# Patient Record
Sex: MALE | Race: WHITE | Employment: UNEMPLOYED | URBAN - METROPOLITAN AREA
[De-identification: names, ages, dates, MRNs, and addresses within clinical notes are randomized per-mention and may not be internally consistent; named-entity substitution may affect disease eponyms.]

---

## 2022-12-15 ENCOUNTER — HOSPITAL ENCOUNTER (EMERGENCY)
Age: 42
Discharge: HOME | End: 2022-12-15
Attending: EMERGENCY MEDICINE

## 2022-12-15 VITALS
HEIGHT: 68 IN | HEART RATE: 87 BPM | BODY MASS INDEX: 30.31 KG/M2 | OXYGEN SATURATION: 99 % | SYSTOLIC BLOOD PRESSURE: 112 MMHG | RESPIRATION RATE: 16 BRPM | TEMPERATURE: 98 F | DIASTOLIC BLOOD PRESSURE: 69 MMHG | WEIGHT: 200 LBS

## 2022-12-15 DIAGNOSIS — F10.920 ACUTE ALCOHOLIC INTOXICATION WITHOUT COMPLICATION (HCC): ICD-10-CM

## 2022-12-15 DIAGNOSIS — F39 MOOD DISORDER (HCC): Primary | ICD-10-CM

## 2022-12-15 LAB
ACETAMINOPHEN LEVEL: <5 MCG/ML (ref 10–30)
ALBUMIN SERPL-MCNC: 4.7 G/DL (ref 3.5–5.2)
ALP BLD-CCNC: 99 U/L (ref 40–129)
ALT SERPL-CCNC: 16 U/L (ref 0–40)
AMPHETAMINE SCREEN, URINE: NOT DETECTED
ANION GAP SERPL CALCULATED.3IONS-SCNC: 15 MMOL/L (ref 7–16)
AST SERPL-CCNC: 15 U/L (ref 0–39)
BARBITURATE SCREEN URINE: NOT DETECTED
BASOPHILS ABSOLUTE: 0.06 E9/L (ref 0–0.2)
BASOPHILS RELATIVE PERCENT: 0.7 % (ref 0–2)
BENZODIAZEPINE SCREEN, URINE: NOT DETECTED
BILIRUB SERPL-MCNC: <0.2 MG/DL (ref 0–1.2)
BUN BLDV-MCNC: 15 MG/DL (ref 6–20)
CALCIUM SERPL-MCNC: 9.6 MG/DL (ref 8.6–10.2)
CANNABINOID SCREEN URINE: NOT DETECTED
CHLORIDE BLD-SCNC: 110 MMOL/L (ref 98–107)
CO2: 19 MMOL/L (ref 22–29)
COCAINE METABOLITE SCREEN URINE: NOT DETECTED
CREAT SERPL-MCNC: 0.7 MG/DL (ref 0.7–1.2)
EKG ATRIAL RATE: 101 BPM
EKG P AXIS: 24 DEGREES
EKG P-R INTERVAL: 184 MS
EKG Q-T INTERVAL: 360 MS
EKG QRS DURATION: 100 MS
EKG QTC CALCULATION (BAZETT): 466 MS
EKG R AXIS: -9 DEGREES
EKG T AXIS: 15 DEGREES
EKG VENTRICULAR RATE: 101 BPM
EOSINOPHILS ABSOLUTE: 0.17 E9/L (ref 0.05–0.5)
EOSINOPHILS RELATIVE PERCENT: 2.1 % (ref 0–6)
ETHANOL: 101 MG/DL (ref 0–0.08)
ETHANOL: 272 MG/DL (ref 0–0.08)
FENTANYL SCREEN, URINE: NOT DETECTED
GFR SERPL CREATININE-BSD FRML MDRD: >60 ML/MIN/1.73
GLUCOSE BLD-MCNC: 107 MG/DL (ref 74–99)
HCT VFR BLD CALC: 46.6 % (ref 37–54)
HEMOGLOBIN: 15.6 G/DL (ref 12.5–16.5)
IMMATURE GRANULOCYTES #: 0.02 E9/L
IMMATURE GRANULOCYTES %: 0.2 % (ref 0–5)
INFLUENZA A: NOT DETECTED
INFLUENZA B: NOT DETECTED
LYMPHOCYTES ABSOLUTE: 3.19 E9/L (ref 1.5–4)
LYMPHOCYTES RELATIVE PERCENT: 38.5 % (ref 20–42)
Lab: NORMAL
MCH RBC QN AUTO: 30.8 PG (ref 26–35)
MCHC RBC AUTO-ENTMCNC: 33.5 % (ref 32–34.5)
MCV RBC AUTO: 92.1 FL (ref 80–99.9)
METHADONE SCREEN, URINE: NOT DETECTED
MONOCYTES ABSOLUTE: 0.88 E9/L (ref 0.1–0.95)
MONOCYTES RELATIVE PERCENT: 10.6 % (ref 2–12)
NEUTROPHILS ABSOLUTE: 3.97 E9/L (ref 1.8–7.3)
NEUTROPHILS RELATIVE PERCENT: 47.9 % (ref 43–80)
OPIATE SCREEN URINE: NOT DETECTED
OXYCODONE URINE: NOT DETECTED
PDW BLD-RTO: 12.5 FL (ref 11.5–15)
PHENCYCLIDINE SCREEN URINE: NOT DETECTED
PLATELET # BLD: 408 E9/L (ref 130–450)
PMV BLD AUTO: 9.3 FL (ref 7–12)
POTASSIUM SERPL-SCNC: 4.3 MMOL/L (ref 3.5–5)
RBC # BLD: 5.06 E12/L (ref 3.8–5.8)
SALICYLATE, SERUM: <0.3 MG/DL (ref 0–30)
SARS-COV-2 RNA, RT PCR: NOT DETECTED
SODIUM BLD-SCNC: 144 MMOL/L (ref 132–146)
TOTAL PROTEIN: 8 G/DL (ref 6.4–8.3)
TRICYCLIC ANTIDEPRESSANTS SCREEN SERUM: NEGATIVE NG/ML
WBC # BLD: 8.3 E9/L (ref 4.5–11.5)

## 2022-12-15 PROCEDURE — 93005 ELECTROCARDIOGRAM TRACING: CPT | Performed by: EMERGENCY MEDICINE

## 2022-12-15 PROCEDURE — 80179 DRUG ASSAY SALICYLATE: CPT

## 2022-12-15 PROCEDURE — 96372 THER/PROPH/DIAG INJ SC/IM: CPT

## 2022-12-15 PROCEDURE — 6360000002 HC RX W HCPCS: Performed by: EMERGENCY MEDICINE

## 2022-12-15 PROCEDURE — 80143 DRUG ASSAY ACETAMINOPHEN: CPT

## 2022-12-15 PROCEDURE — 87636 SARSCOV2 & INF A&B AMP PRB: CPT

## 2022-12-15 PROCEDURE — 99285 EMERGENCY DEPT VISIT HI MDM: CPT

## 2022-12-15 PROCEDURE — 85025 COMPLETE CBC W/AUTO DIFF WBC: CPT

## 2022-12-15 PROCEDURE — 80053 COMPREHEN METABOLIC PANEL: CPT

## 2022-12-15 PROCEDURE — 82077 ASSAY SPEC XCP UR&BREATH IA: CPT

## 2022-12-15 PROCEDURE — 80307 DRUG TEST PRSMV CHEM ANLYZR: CPT

## 2022-12-15 RX ORDER — QUETIAPINE FUMARATE 25 MG/1
TABLET, FILM COATED ORAL
COMMUNITY
Start: 2022-10-30

## 2022-12-15 RX ORDER — NALTREXONE HYDROCHLORIDE 50 MG/1
TABLET, FILM COATED ORAL
COMMUNITY
Start: 2022-11-08

## 2022-12-15 RX ORDER — LORAZEPAM 2 MG/ML
2 INJECTION INTRAMUSCULAR ONCE
Status: COMPLETED | OUTPATIENT
Start: 2022-12-15 | End: 2022-12-15

## 2022-12-15 RX ORDER — FLUOXETINE HYDROCHLORIDE 20 MG/1
CAPSULE ORAL
COMMUNITY
Start: 2022-10-31

## 2022-12-15 RX ORDER — DIPHENHYDRAMINE HYDROCHLORIDE 50 MG/ML
25 INJECTION INTRAMUSCULAR; INTRAVENOUS ONCE
Status: COMPLETED | OUTPATIENT
Start: 2022-12-15 | End: 2022-12-15

## 2022-12-15 RX ORDER — HALOPERIDOL 5 MG/ML
5 INJECTION INTRAMUSCULAR ONCE
Status: COMPLETED | OUTPATIENT
Start: 2022-12-15 | End: 2022-12-15

## 2022-12-15 RX ORDER — TRAZODONE HYDROCHLORIDE 50 MG/1
TABLET ORAL
COMMUNITY
Start: 2022-11-08

## 2022-12-15 RX ORDER — FLUOXETINE HYDROCHLORIDE 40 MG/1
CAPSULE ORAL
COMMUNITY
Start: 2022-12-05

## 2022-12-15 RX ADMIN — DIPHENHYDRAMINE HYDROCHLORIDE 25 MG: 50 INJECTION, SOLUTION INTRAMUSCULAR; INTRAVENOUS at 04:33

## 2022-12-15 RX ADMIN — LORAZEPAM 2 MG: 2 INJECTION INTRAMUSCULAR; INTRAVENOUS at 04:34

## 2022-12-15 RX ADMIN — HALOPERIDOL LACTATE 5 MG: 5 INJECTION INTRAMUSCULAR at 04:33

## 2022-12-15 ASSESSMENT — LIFESTYLE VARIABLES
HOW MANY STANDARD DRINKS CONTAINING ALCOHOL DO YOU HAVE ON A TYPICAL DAY: 10 OR MORE
HOW OFTEN DO YOU HAVE A DRINK CONTAINING ALCOHOL: 4 OR MORE TIMES A WEEK

## 2022-12-15 ASSESSMENT — PAIN - FUNCTIONAL ASSESSMENT
PAIN_FUNCTIONAL_ASSESSMENT: NONE - DENIES PAIN
PAIN_FUNCTIONAL_ASSESSMENT: NONE - DENIES PAIN

## 2022-12-15 NOTE — ED PROVIDER NOTES
Patient was seen overnight by overnight physician due to alcohol intoxication and making suicidal threats. Patient had been pink slipped prior to arrival.  I was asked to evaluate the patient because he is now sober. On my evaluation, the patient is calm and cooperative. He appears clinically sober and is answering questions appropriately. He does not remember anything that happened last night. He is adamantly denying any suicidal or homicidal ideation. He has good decision-making capacity. States that he needs to go to work and he also needs to care for his wife who has cancer. He was observed ambulating and eating without difficulty. He is appropriate for discharge. Will revoke pink slip.      Forrest Lam MD  12/15/22 3393

## 2022-12-15 NOTE — ED PROVIDER NOTES
HPI:  12/15/22, Time: 4:04 AM JORGE A Tello is a 36 y.o. male presenting to the ED for psychiatric evaluation, beginning 1 day ago. The complaint has been persistent, moderate in severity, and worsened by nothing. Patient presenting here because of psychiatric evaluation. Patient was causing disturbance in a hotel. Patient was agitated. Patient reportedly stated to police officers that he wanted them to shoot him. Patient does report feeling suicidal.  Patient does report he is on Seroquel. Patient reporting no homicidal ideation. He reports no hallucinations. Patient does admit to drinking alcohol today. Patient reporting no abdominal pain no vomiting there is no history of fall or injury. Patient reporting no cough. There is no fever. ROS:   Pertinent positives and negatives are stated within HPI, all other systems reviewed and are negative.  --------------------------------------------- PAST HISTORY ---------------------------------------------  Past Medical History:  has no past medical history on file. Past Surgical History:  has no past surgical history on file. Social History:  reports current alcohol use. Family History: family history is not on file. The patients home medications have been reviewed. Allergies: Patient has no known allergies. ---------------------------------------------------PHYSICAL EXAM--------------------------------------    Constitutional/General: Alert and oriented to person and place but not time. Patient intoxicated  Head: Normocephalic and atraumatic  Eyes: PERRL, EOMI  Mouth: Oropharynx clear, handling secretions, no trismus  Neck: Supple, full ROM, non tender to palpation in the midline, no stridor, no crepitus, no meningeal signs  Pulmonary: Lungs clear to auscultation bilaterally, no wheezes, rales, or rhonchi. Not in respiratory distress  Cardiovascular:  Regular rate. Regular rhythm. No murmurs, gallops, or rubs.  2+ distal pulses  Chest: no chest wall tenderness  Abdomen: Soft. Non tender. Non distended. +BS. No rebound, guarding, or rigidity. No pulsatile masses appreciated. Musculoskeletal: Moves all extremities x 4. Warm and well perfused, no clubbing, cyanosis, or edema. Capillary refill <3 seconds  Skin: warm and dry. No rashes. Neurologic: Oriented to person place but not to time, CN 2-12 grossly intact, no focal deficits, symmetric strength 5/5 in the upper and lower extremities bilaterally  Psych: Patient intoxicated reporting suicidal thoughts. Patient reportedly wanted police to shoot him. Patient reporting no homicidal ideation or hallucinations    -------------------------------------------------- RESULTS -------------------------------------------------  I have personally reviewed all laboratory and imaging results for this patient. Results are listed below.      LABS:  Results for orders placed or performed during the hospital encounter of 12/15/22   COVID-19 & Influenza Combo    Specimen: Nasopharyngeal Swab   Result Value Ref Range    SARS-CoV-2 RNA, RT PCR NOT DETECTED NOT DETECTED    INFLUENZA A NOT DETECTED NOT DETECTED    INFLUENZA B NOT DETECTED NOT DETECTED   CBC with Auto Differential   Result Value Ref Range    WBC 8.3 4.5 - 11.5 E9/L    RBC 5.06 3.80 - 5.80 E12/L    Hemoglobin 15.6 12.5 - 16.5 g/dL    Hematocrit 46.6 37.0 - 54.0 %    MCV 92.1 80.0 - 99.9 fL    MCH 30.8 26.0 - 35.0 pg    MCHC 33.5 32.0 - 34.5 %    RDW 12.5 11.5 - 15.0 fL    Platelets 601 833 - 512 E9/L    MPV 9.3 7.0 - 12.0 fL    Neutrophils % 47.9 43.0 - 80.0 %    Immature Granulocytes % 0.2 0.0 - 5.0 %    Lymphocytes % 38.5 20.0 - 42.0 %    Monocytes % 10.6 2.0 - 12.0 %    Eosinophils % 2.1 0.0 - 6.0 %    Basophils % 0.7 0.0 - 2.0 %    Neutrophils Absolute 3.97 1.80 - 7.30 E9/L    Immature Granulocytes # 0.02 E9/L    Lymphocytes Absolute 3.19 1.50 - 4.00 E9/L    Monocytes Absolute 0.88 0.10 - 0.95 E9/L    Eosinophils Absolute 0.17 0.05 - 0.50 E9/L    Basophils Absolute 0.06 0.00 - 0.20 E9/L   Comprehensive Metabolic Panel   Result Value Ref Range    Sodium 144 132 - 146 mmol/L    Potassium 4.3 3.5 - 5.0 mmol/L    Chloride 110 (H) 98 - 107 mmol/L    CO2 19 (L) 22 - 29 mmol/L    Anion Gap 15 7 - 16 mmol/L    Glucose 107 (H) 74 - 99 mg/dL    BUN 15 6 - 20 mg/dL    Creatinine 0.7 0.7 - 1.2 mg/dL    Est, Glom Filt Rate >60 >=60 mL/min/1.73    Calcium 9.6 8.6 - 10.2 mg/dL    Total Protein 8.0 6.4 - 8.3 g/dL    Albumin 4.7 3.5 - 5.2 g/dL    Total Bilirubin <0.2 0.0 - 1.2 mg/dL    Alkaline Phosphatase 99 40 - 129 U/L    ALT 16 0 - 40 U/L    AST 15 0 - 39 U/L   Serum Drug Screen   Result Value Ref Range    Ethanol Lvl 272 mg/dL    Acetaminophen Level <5.0 (L) 10.0 - 33.8 mcg/mL    Salicylate, Serum <5.4 0.0 - 30.0 mg/dL    TCA Scrn NEGATIVE Cutoff:300 ng/mL   URINE DRUG SCREEN   Result Value Ref Range    Amphetamine Screen, Urine NOT DETECTED Negative <1000 ng/mL    Barbiturate Screen, Ur NOT DETECTED Negative < 200 ng/mL    Benzodiazepine Screen, Urine NOT DETECTED Negative < 200 ng/mL    Cannabinoid Scrn, Ur NOT DETECTED Negative < 50ng/mL    Cocaine Metabolite Screen, Urine NOT DETECTED Negative < 300 ng/mL    Opiate Scrn, Ur NOT DETECTED Negative < 300ng/mL    PCP Screen, Urine NOT DETECTED Negative < 25 ng/mL    Methadone Screen, Urine NOT DETECTED Negative <300 ng/mL    Oxycodone Urine NOT DETECTED Negative <100 ng/mL    FENTANYL SCREEN, URINE NOT DETECTED Negative <1 ng/mL    Drug Screen Comment: see below        RADIOLOGY:  Interpreted by Radiologist.  No orders to display       EKG: This EKG is signed and interpreted by me. Rate: 101  Rhythm: Sinus tachycardia  Interpretation: no acute changes  Comparison: no previous EKG available        ------------------------- NURSING NOTES AND VITALS REVIEWED ---------------------------   The nursing notes within the ED encounter and vital signs as below have been reviewed by myself.   BP (!) 141/82 Pulse 90   Temp 97.7 °F (36.5 °C) (Oral)   Resp 20   Ht 5' 8\" (1.727 m)   Wt 200 lb (90.7 kg)   SpO2 95%   BMI 30.41 kg/m²   Oxygen Saturation Interpretation: Normal    The patients available past medical records and past encounters were reviewed. ------------------------------ ED COURSE/MEDICAL DECISION MAKING----------------------  Medications   haloperidol lactate (HALDOL) injection 5 mg (5 mg IntraMUSCular Given 12/15/22 0433)   LORazepam (ATIVAN) injection 2 mg (2 mg IntraMUSCular Given 12/15/22 0434)   diphenhydrAMINE (BENADRYL) injection 25 mg (25 mg IntraMUSCular Given 12/15/22 0433)             Medical Decision Making:   Presenting here because psychiatric evaluation. Patient was making a disturbance at a hotel reportedly was agitated and to staff as well as to police that he wanted to harm himself. Patient presented here he is awake alert he is intoxicated does report that he does feel suicidal.  Patient reports he is on Seroquel and other medications. Patient reporting no physical planes of chest pain shortness of breath or abdominal pain. Patient is neurologically intact. Patient labs noted reviewed and EKG noted. Patient medically clear for  to evaluate     Re-Evaluations:             Patient reexamined and attempting to get out of bed and making lewd comments to staff. And patient attempting to embrace staff. Patient was medicated with Haldol Ativan and Benadryl. Patient reassessed and currently sleeping      Consultations:                 Critical Care: This patient's ED course included: a personal history and physicial eaxmination    This patient has been closely monitored during their ED course. Counseling: The emergency provider has spoken with the patient and discussed todays results, in addition to providing specific details for the plan of care and counseling regarding the diagnosis and prognosis.   Questions are answered at this time and they are agreeable with the plan.       --------------------------------- IMPRESSION AND DISPOSITION ---------------------------------    IMPRESSION  1. Mood disorder ([facility] [address]    2. Acute alcoholic intoxication without complication ([facility] [place] [facility]        DISPOSITION  Disposition:  to assist with disposition  Patient condition is stable        NOTE: This report was transcribed using voice recognition software.  Every effort was made to ensure accuracy; however, inadvertent computerized transcription errors may be present          Olivier Stoll MD  12/15/22 0327

## 2022-12-15 NOTE — ED NOTES
This RN has assumed care for pt. Pt resting in bed with eyes closed. CO at bedside. Will continue to monitor pt.       Nile Leung RN  12/15/22 6497

## 2022-12-15 NOTE — ED NOTES
This RN addressed pt. Repeat ethanol level drawn. RN updated pt on his status in the department and that he is to be evaluated once his repeat ethanol level is resulted. Pt calm, cooperative and expressing regret over his actions last night. CO at bedside.       Rowan Napier RN  12/15/22 4948

## 2022-12-15 NOTE — ED NOTES
Pt alert and oriented, sitting up in bed, denies SI/HI/hallucinating. Asking to speak to a provider to go home. States he would \"never kill himself or take his own life. I drank too much and this is why I don't drink. I have a lot going on at home, but I would never take my own life. \"   Co at bedside. Pt provided with a meal tray.        Sunil Matute RN  12/15/22 4881

## 2022-12-15 NOTE — ED NOTES
Patient in bed resting comfortably at this time. Sleeping.  Respirations easy and unlabored      Teresa Brown RN  12/15/22 9644

## 2022-12-15 NOTE — ED NOTES
This RN called lab regarding pending ethanol level. Lab states that level was not running. Level should now result within the next 20 minutes. Will follow up.       Christa Olmedo RN  12/15/22 1415

## 2022-12-15 NOTE — ED NOTES
Patient arrived to ed via squad, +etoh intoxication, verbally inappropriate with female staff stating \" I love you\", \"come here let me touch you\", \"I got something for you, my cock, touch it\". Patient also physically touching and trying to rub, grope and kiss female staff. Patient was told multiple times that touching staff is not okay. Patient redirects briefly, then returns to harassing female staff and forcing unwanted physical interactions. Patient medicated with IM medication, x 3, but continues to get out of bed and be inappropriate.       Snow Drummond RN  12/15/22 9896

## 2022-12-15 NOTE — ED NOTES
Behavioral Health Crisis Assessment    Chief Complaint:    Patient appears to be extremely intoxicated and is a poor historian. He cannot varify medications or previous medical history. Patient is pink slipped by [facility] for making statemnets asking the officers to just kill him    Mental Status Exam:  pt is asleep at this time - from notation, depressed, hopeless and suicidal    Legal Status  [] Voluntary:  [x] Involuntary, Issued by:  [facility] police    Gender  [x] Male [] Female [] Transgender  [] Other    Sexual Orientation    [x] Heterosexual [] Homosexual [] Bisexual [] Other    Brief Clinical Summary:  Pt was pink slipped by [employer], indicating that pt stated numerous times \"I want to kill myself\" and \"just shoot me in the heart\". Subject is highly toxicated and disorderly. Per Dr. Ancelmo Yarbrough, Patient presenting here because of psychiatric evaluation. Patient was causing disturbance in a hotel. Patient was agitated. Patient reportedly stated to police officers that he wanted them to shoot him. Patient does report feeling suicidal.  Patient does report he is on Seroquel. Patient reporting no homicidal ideation. Patient was making a disturbance at a hotel reportedly was agitated and to staff as well as to police that he wanted to harm himself. Patient presented here he is awake alert he is intoxicated does report that he does feel suicidal.  Patient reports he is on Seroquel and other medications. There are no previous admissions noted in pt's chart. I attempted to wake pt, but he is soundly asleep. At this time, with noted information, pt meets criteria for admission. Collateral Information:   No emergency contacts listed.   ETOH redraw scheduled for 1300    Risk Factors:  Substance Use    Protective Factors:  TBD    Suicidal Ideations:   [x] Reports:    [] Past [] Present   [] Denies    Suicide Attempts: **  [] Reports:   [] Denies    C-SSRS Screening Completed by RN: Current Suicide Risk:  [] No Risk [] Low [] Moderate [x] High    Homicidal Ideations  [] Reports:   [] Past [] Present   [x] Denies per dr Sujit Pool    Self Injurious/Self Mutilation Behaviors: **  [] Reports:    [] Past [] Present   [] Denies    Hallucinations/Delusions **  [] Reports:   [] Denies     Substance Use/Alcohol Use/Addiction:   [x] Reports:   [] Denies   [x] SBIRT Screen Complete. Current or Past Substance Abuse Treatment**  [] Yes, When and Where:  [] No    Current or Past Mental Health Treatment:**  [] Yes, When and Where:  [] No    Legal Issues:**  []  Yes (Specify)  []  No    Access to Weapons:**  []  Yes (Specify)  []  No    Trauma History**  [] Reports:  [] Denies     Living Situation:**    Employment: **    Education Level:**    Violence Risk Screening:**        Have you ever thought about hurting someone? []  No  []  Yes (Ask the questions listed below)   When? Did you follow through with the thoughts? [] No     [] Yes- When and what happened? 2.  Have you ever threatened anyone? []  No  []  Yes (Ask the questions listed below)   When and what happened? Have you ever threatened someone with a gun, knife or other weapon? []  No  []  Yes - When and what happened? 2. Have you ever had an order of protection taken out against you? []  Yes []  No  3. Have you ever been arrested due to violence? []  Yes []  No  4. Have you ever been cruel to animals? []  Yes []  No      **  After consideration of C-SSRS screening results, C-SSRS assessments, and this professional's assessment the patient's overall suicide risk assessed to be:  [] No Risk  [] Low   [] Moderate   [] High     [] Discussed current suicide risk, protective and risk factors with RN and ED Physician     Disposition   [] Home:   [] Outpatient Provider:   [] Crisis Unit:   [x] Inpatient Psychiatric Unit:  [] Other:       **I WILL COMPLETE THIS NOTE WHEN PT IS MORE ALERT AND ABLE TO BE ASSESSED.                HILDA Gaytan  12/15/22 7337

## 2022-12-15 NOTE — ED NOTES
Belongings obtained from [facility] and given to pt.   Discharge instructions reviewed      Sonya Greco RN  12/15/22 4411

## 2022-12-15 NOTE — DISCHARGE INSTRUCTIONS
FOLLOW UP WITH [facility] FOR COUNSELING AND PSYCHIATRY. Help Hotline 346 799-6678 or 8-970.663.3265 or 211   24 hour crisis line for the following [address]. [facility]    [facility] LINE: [phone]  Monday through Friday 4pm to 8pm and Saturday 1pm-3pm   You can call during these times to talk with a peer support person as needed

## 2022-12-15 NOTE — ED TRIAGE NOTES
Patient appears to be extremely intoxicated and is a poor historian. He cannot varify medications or previous medical history.  Patient is pink slipped by [employer] [facility] [employer] for making statemnets asking the officers to just kill him

## 2022-12-15 NOTE — ED NOTES
Awaiting etoh to result to proceed with assessing for needed level of care     Reilly Seymour, HILDA  12/15/22 5397

## 2022-12-15 NOTE — ED NOTES
Will call wife for more info    Risk Factors:  Works out of town a lot  Substance use- alcohol  Wife has cancer diagnosis    Protective Factors:  Supportive spouse and friends  Steady income/ employment  Safe stable housing  Good communication skills  No access to weapons  Medication compliant  Outpatient [facility] provider    Suicidal Ideations:   [] Reports:    [] Past [] Present   [x] Denies    Suicide Attempts:  [] Reports:   [x] Denies    C-SSRS Screening Completed by RN: Current Suicide Risk:  [] No Risk [] Low [] Moderate [x] High    Homicidal Ideations  [] Reports:   [] Past [] Present   [x] Denies     Self Injurious/Self Mutilation Behaviors:   [] Reports:    [] Past [] Present   [x] Denies    Hallucinations/Delusions   [] Reports:   [x] Denies     Substance Use/Alcohol Use/Addiction:   [x] Reports:   [] Denies   [x] SBIRT Screen Complete. Current or Past Substance Abuse Treatment  [x] Yes, When and Where: In [place] over 10 years ago  [] No    Current or Past Mental Health Treatment:  [] Yes, When and Where: [address] [place] [address]- counseling and psychiatry with medication management  [] No    Legal Issues:  []  Yes (Specify)  [x]  No    Access to Weapons:  []  Yes (Specify)  [x]  No    Trauma History  [x] Reports: wife has cancer  [] Denies     Living Situation: lives with wife in [place]    Employment:     Education Level: grad of college- [employer]     Violence Risk Screening:        Have you ever thought about hurting someone? [x]  No  []  Yes (Ask the questions listed below)   When? Did you follow through with the thoughts? [x] No     [] Yes- When and what happened? 2.  Have you ever threatened anyone? [x]  No  []  Yes (Ask the questions listed below)   When and what happened? Have you ever threatened someone with a gun, knife or other weapon? [x]  No  []  Yes - When and what happened? 2. Have you ever had an order of protection taken out against you?  []  Yes [] No  3. Have you ever been arrested due to violence? []  Yes [x]  No  4. Have you ever been cruel to animals?  []  Yes [x]  No    After consideration of C-SSRS screening results, C-SSRS assessments, and this professional's assessment the patient's overall suicide risk assessed to be:  [x] No Risk  [] Low   [] Moderate   [] High     [x] Discussed current suicide risk, protective and risk factors with RN and ED Physician     Disposition   [] Home:   [] Outpatient Provider:   [] Crisis Unit:   [] Inpatient Psychiatric Unit:  [] Other:                    Karen Vásquez  12/15/22 [address] [place] [address]  12/15/22 2577